# Patient Record
Sex: FEMALE | Race: WHITE | NOT HISPANIC OR LATINO | Employment: OTHER | URBAN - METROPOLITAN AREA
[De-identification: names, ages, dates, MRNs, and addresses within clinical notes are randomized per-mention and may not be internally consistent; named-entity substitution may affect disease eponyms.]

---

## 2018-01-02 PROBLEM — R79.89 ELEVATED LACTIC ACID LEVEL: Status: ACTIVE | Noted: 2018-01-02

## 2018-01-02 PROBLEM — K52.9 COLITIS: Status: ACTIVE | Noted: 2018-01-02

## 2018-01-02 PROBLEM — C50.919 BREAST CANCER: Status: ACTIVE | Noted: 2018-01-02

## 2018-01-03 PROBLEM — R19.7 DIARRHEA, UNSPECIFIED: Status: ACTIVE | Noted: 2018-01-03

## 2018-01-24 PROBLEM — R79.89 ELEVATED LACTIC ACID LEVEL: Status: RESOLVED | Noted: 2018-01-02 | Resolved: 2018-01-24

## 2018-01-24 PROBLEM — C50.919 BREAST CANCER: Status: RESOLVED | Noted: 2018-01-02 | Resolved: 2018-01-24

## 2018-01-24 PROBLEM — R19.7 DIARRHEA, UNSPECIFIED: Status: RESOLVED | Noted: 2018-01-03 | Resolved: 2018-01-24

## 2018-01-24 PROBLEM — K52.9 ACUTE COLITIS: Status: RESOLVED | Noted: 2018-01-02 | Resolved: 2018-01-24

## 2018-06-13 ENCOUNTER — DOCUMENTATION ONLY (OUTPATIENT)
Dept: RADIATION ONCOLOGY | Facility: CLINIC | Age: 73
End: 2018-06-13

## 2018-06-13 ENCOUNTER — OFFICE VISIT (OUTPATIENT)
Dept: RADIATION ONCOLOGY | Facility: CLINIC | Age: 73
End: 2018-06-13
Payer: MEDICARE

## 2018-06-13 VITALS
HEIGHT: 63 IN | BODY MASS INDEX: 29.06 KG/M2 | RESPIRATION RATE: 18 BRPM | TEMPERATURE: 98 F | HEART RATE: 76 BPM | DIASTOLIC BLOOD PRESSURE: 67 MMHG | SYSTOLIC BLOOD PRESSURE: 129 MMHG | WEIGHT: 164 LBS

## 2018-06-13 DIAGNOSIS — C50.919: ICD-10-CM

## 2018-06-13 DIAGNOSIS — C50.912 INVASIVE DUCTAL CARCINOMA OF BREAST, FEMALE, LEFT: Primary | ICD-10-CM

## 2018-06-13 PROCEDURE — 99205 OFFICE O/P NEW HI 60 MIN: CPT | Mod: ,,, | Performed by: RADIOLOGY

## 2018-06-13 NOTE — PROGRESS NOTES
Mandy Hidalgo  [id number]  1945 6/13/2018  Mauricio Martinez Iii, [place]  [address]  [place] [address]    REASON FOR CONSULTATION: Clinical IIA, [id number] converted to IA, [id number](sn)(i-)M0 triple +, g2 IDC L breast  TREATMENT GOAL: adjuvant    HISTORY OF PRESENT ILLNESS:   73F with screening mammogram detected abnormality of the left breast confirmed by ultrasound to be 1.3 cm in the 2:00 position. MRI of the breast confirmed this to be a 2.1 cm area of enhancement. Biopsy left breast mass returned triple positive, grade 2 invasive ductal carcinoma while biopsy of a left axillary lymph node was ruled reactive. She was placed on neoadjuvant systemic therapy which was poorly tolerated. Originally planned for TCHP converted to THP and stopped before end of program.    *3/18 MRI Breasts  Post neoadjuvant evaluation reveals no residual enhancement and marked decrease in size of the circumscribed lesion with biopsy clip still in place and previously biopsied reactive lymph node decreased in size  *4/18 Dr. Michelle JARAMILLO lumpectomy and SLNBx   - 7 mm invasive ductal carcinoma, grade 2, SBR 6/9, LVSI and PNI negative   - no DCIS   - margin (0.5mm post)   - 0/3 SLNs (i-)    *Dr. Sultana: complete 1yr q3wk Herceptin +/- Perjeta based on tolerance; + AI  *Dr. Martinez: proceed to adjuvant RT    At today's visit patient denies fever, chills, chest pain, shortness of breath. 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 She does have MS diagnosed in 2003 which is 1 flare since that time. She has plans to continue with Herceptin and Perjeta during radiotherapy. She denies decreased range of motion of the left upper extremity, swelling of the left upper extremity, pain or discomfort within the breast.    Review of Systems   Constitutional: Negative for appetite change, chills, fever and unexpected weight change.   HENT:   Negative for lump/mass, mouth sores, sore throat, trouble  "swallowing and voice change.    Eyes: Negative for eye problems and icterus.   Respiratory: Negative for cough, hemoptysis and shortness of breath.    Cardiovascular: Negative for chest pain and leg swelling.   Gastrointestinal: Negative for abdominal pain, constipation, diarrhea, nausea and vomiting.   Genitourinary: Negative for dysuria, frequency, hematuria, nocturia and vaginal bleeding.    Musculoskeletal: Positive for gait problem. Negative for back pain, neck pain and neck stiffness.   Neurological: Positive for extremity weakness and gait problem. Negative for headaches, numbness and seizures.   Hematological: Negative for adenopathy.     Past Medical History:   Diagnosis Date    a Family H/O CAD     Dr. Rui Aparicio In [place]; "SEs q5Yr;" 1/24/18 RXd ASA 81 Mg Daily; Her Father    b Hypertension     Dr. Rui Aparicio In [place]; On Losartan 100 Mg Daily, And Toprol-XL 25 Mg Daily    c Low HDL Levels     4/5/18 RXd OTC Flaxseed Oil 2K Mg Daily; Lipitor Historically Caused Myalgias And Bigeminy And Neurology Stated No More Statins    c Mild Hypertriglyceridemia     d Prediabetes     j H/O Laparoscopic Cholecystectomy In 12/2017     Dr. Allen Urrutia In [place]    j Mildly Elevated Lactic Acid Levels     [facility] 1/2/18-1/5/18 Stay Included This Diagnosis    j Noninfectious Colitis     [facility] 1/2/18-1/5/18 Stay For This; Was Felt Likely Due To CMT And Postcholecystectomy State; Was D/Cd On Alternating PRN Imodium And Lomotil, And Cholestyramine (Questran Light) 4 Gm TID With Meals But This Was Innefective    j Postcholecystectomy Diarrhea     [facility] 1/2/18-1/5/18 Stay For This; Was Felt Likely Due To CMT And Postcholecystectomy State; Was D/Cd On Alternating PRN Imodium And Lomotil, And Cholestyramine (Questran Light) 4 Gm TID With Meals But This Was Innefective    k Stage IIa Left Breast Cancer DXd In 11/2017     Dr. Luis Sultana At [facility] In [place]; Currently (As Of 01/2018) Is On " CMT; This Was Sentinal Node Negative; After 5 CMT TXs She Will Have A Left Lumpectomy Followed By XRT TXs And Herceptin    m H/O Brain AVM Repair Via Left Occipital Craniectomy     With H/O Intracranial Hemorrhage; In 08/2014; She Has Right Sided Numbness, And A Mild LUE Tremor, And Diplopia, And Ataxia     m Migraines     m Multiple Sclerosis     Dr. Anant Sauer; On Copaxone 40 Mg SQ TIW For This    p Diplopia     q Vitamin D Deficiency     4/5/18 RXd OTC D3 5K IU Daily     Past Surgical History:   Procedure Laterality Date    BRAIN AVM REPAIR      BREAST LUMPECTOMY Left     CHOLECYSTECTOMY      HYSTERECTOMY       Social History     Social History    Marital status:      Spouse name: N/A    Number of children: N/A    Years of education: N/A     Social History Main Topics    Smoking status: Never Smoker    Smokeless tobacco: Never Used    Alcohol use Yes      Comment: 1 martini every night    Drug use: No    Sexual activity: Not Asked     Other Topics Concern    None     Social History Narrative    None     Family History   Problem Relation Age of Onset    Heart disease Father        PRIOR HISTORY OF CHEMOTHERAPY OR RADIOTHERAPY: Please see HPI for patients prior oncologic history.    Medication List with Changes/Refills   Current Medications    CHOLECALCIFEROL, VITAMIN D3, 5,000 UNIT TAB    Take 5,000 Units by mouth once daily.    CYANOCOBALAMIN (VITAMIN B-12) 1000 MCG TABLET    Take 100 mcg by mouth once daily.    FLAXSEED OIL 1,000 MG CAP    Take 2,000 mg by mouth once daily.    GLATIRAMER (COPAXONE) 40 MG/ML SYRG INJECTION    Inject 40 mg into the skin 3 (three) times a week.    METFORMIN (GLUCOPHAGE) 500 MG TABLET    Take 500 mg by mouth 2 (two) times daily with meals.     METOPROLOL SUCCINATE (TOPROL-XL) 25 MG 24 HR TABLET    Take 1 tablet (25 mg total) by mouth once daily.   Discontinued Medications    L.ACIDOPHIL,PARAC-S.THERM-BIF. (RISAQUAD) CAP CAPSULE    Take 1 capsule by  "mouth once daily.    LOSARTAN (COZAAR) 100 MG TABLET    Take 1 tablet (100 mg total) by mouth once daily.    METFORMIN (GLUCOPHAGE) 1000 MG TABLET    Take 1 tablet (1,000 mg total) by mouth 2 (two) times daily with meals.    METOPROLOL TARTRATE (LOPRESSOR) 25 MG TABLET    Take 1 tablet (25 mg total) by mouth once daily.    ONDANSETRON (ZOFRAN-ODT) 8 MG TBDL    4 mg every 6 (six) hours as needed.      Review of patient's allergies indicates:   Allergen Reactions    Codeine Nausea And Vomiting    Lipitor [atorvastatin]      Myalgias And Bigeminy    Piperacillin     Vancomycin analogues        QUALITY OF LIFE: 90%- Able to Carry on Normal Activity: Minor Symptoms of Disease    Vitals:    06/13/18 1337   BP: 129/67   Pulse: 76   Resp: 18   Temp: 98.4 °F (36.9 °C)   TempSrc: Oral   Weight: 74.4 kg (164 lb)   Height: 5' 3" (1.6 m)   PainSc: 0-No pain       PHYSICAL EXAM:   GENERAL: alert; in no apparent distress.   HEAD: normocephalic, atraumatic. Alopecia with wig  EYES: pupils are equal, round, reactive to light and accommodation. Sclera anicteric. Conjunctiva not injected.   NOSE/THROAT: no nasal erythema or rhinorrhea. Oropharynx pink, without erythema, ulcerations or thrush.   NECK: no cervical motion rigidity; supple with no masses.  CHEST: Patient is speaking comfortably on room air with normal work of breathing without using accessory muscles of respiration.  MUSCULOSKELETAL: no tenderness to palpation along the spine or scapulae. Normal range of motion.  NEUROLOGIC: cranial nerves II-XII intact bilaterally. Appreciable right-sided hemiparesis Unsteady gait.  LYMPHATIC: no  axillary adenopathy appreciated bilaterally.   EXTREMITIES: no clubbing, cyanosis, edema.  SKIN: no erythema, rashes, ulcerations noted.   BREAST: Left breast with visible volume reduction however with both incisions clean dry and intact without palpable seroma or nodularity. Approximate B cup.    REVIEW OF IMAGING/PATHOLOGY/LABS: Please " see HPI. All images reviewed personally by dictating physician.     ASSESSMENT: 73F with stage clinical IIA, [id number] converted to IA, [id number](sn)(i-)M0 triple +, g2 IDC L breast s/p incomplete TCHP, margin (-) lumpectomy.  PLAN:   Mrs. Hidalgo was diagnosed with a clinical T2 biopsy-proven N0 invasive ductal carcinoma that was triple positive and was treated incompletely with neoadjuvant systemic therapy secondary to patient intolerance. At lumpectomy she had a 7 mm invasive ductal carcinoma with no evidence of ductal carcinoma in situ. Margins were negative with the closest margin posteriorly at 0.5 mm but per the surgeon included an assessment of the pectoralis. She presents today discussed the role of adjuvant radiotherapy. Notably she is planned for one year of adjuvant dual HER-2 blockade and an aromatase inhibitor per Dr. Yeboah.    I carefully explained that adjuvant radiotherapy is used to complete her breast conserving protocol and has been shown to provide a survival benefit while achieving equivalent oncologic results to mastectomy. I do diagrams demonstrating treatment using opposed tangents and carefully diagrammed the daily treatment position. On examination today, given the chest wall separation and cup size I recommended a hypo-fractionated course of 4050 cGy the left breast, 5050 cGy to lumpectomy cavity. This will be done with 3-D conformal techniques and daily image guidance.    We discussed the process of CT simulation and daily treatment with weekly on treatment visits with the physician and the patient would like to proceed with therapy. Therapy will be completed concurrently with HER-2 blockade.    We discussed the risks and benefits of the above treatment and have gone over in detail the acute and late toxicities of radiation therapy to the left breast. The patient expressed  understanding and has signed a consent form which is included in the patients chart. The patient has our contact  information and understands that they are free to contact us at any time with questions or concerns regarding radiation therapy.    DISPOSITION: RTC FOR CT SIM    I have personally seen and evaluated this patient. Greater than 50% of this time was spent discussing coordination of care and/or counseling.    PHYSICIAN: Rui Jones Jr, MD

## 2018-06-13 NOTE — PROGRESS NOTES
Mandy Hidalgo  [id number]  1945  6/13/2018  No referring provider defined for this encounter.    DIAGNOSIS:Clinical IIA, [id number] converted to IA, [id number](sn)(i-)M0 triple +, g2 IDC L breast     TREATMENT SITE(S): left breast    INTENT: CURATIVE    TREATMENT SETTING: ADJUVANT     MODALITY: PHOTON    TECHNIQUE:  3D CONFORMAL RADIOTHERAPY (3DCRT) with DIODES    IMRT MEDICAL NECESSITY: N/A    I have personally performed treatment planning for the patient, reviewing relevant history/physical and imaging. I have defined GTV, CTV, PTV and organs at risk.     In order to accomplish this plan, I am ordering:  SIMULATION: CT SIMULATION FOR PLACEMENT OF TREATMENT FIELDS    CONTRAST: none    TO ACCOMPLISH REPRODUCIBLE POSITION: VACLOC, BREAST BOARD and WING BOARD    DEVICES FOR BEAM SHAPING: CUSTOMIZED MLC    CUSTOMIZED BOLUS: none    IMAGING: DAILY KV/KV OBI, WEEKLY PORTALS and CBCT DAILY @ [facility]    I have ordered a weekly physics check.    SPECIAL PHYSICS CONSULT: NO  REASON: N/A    SPECIAL TREATMENT CIRCUMSTANCE: YES  Concurrent or recent administration of chemotherapeutic agents which are known potent radiosensitizers and thus will require vigilant monitoring for exaggerated radiation toxicities.    LABS: NONE    ANTICIPATED PRESCRIPTION TO ISOCENTER: 4050cGy/15frx to L breast + 1000cGy/5frx to lumpectomy (total 5050cGy/20frx)    ENERGY: 6/23X    TREATMENT: DAILY    PHYSICIAN: Rui Jones Jr, MD

## 2018-06-13 NOTE — PATIENT INSTRUCTIONS
Instructions given on breast radiation and skin care.  [facility] program booklet given. Patient verbalized understanding.

## 2018-08-06 ENCOUNTER — OFFICE VISIT (OUTPATIENT)
Dept: RADIATION ONCOLOGY | Facility: CLINIC | Age: 73
End: 2018-08-06
Payer: MEDICARE

## 2018-08-06 VITALS — WEIGHT: 164 LBS | BODY MASS INDEX: 29.05 KG/M2

## 2018-08-06 DIAGNOSIS — C50.919: Primary | ICD-10-CM

## 2018-08-06 PROCEDURE — 99024 POSTOP FOLLOW-UP VISIT: CPT | Mod: ,,, | Performed by: RADIOLOGY

## 2018-08-06 NOTE — PROGRESS NOTES
Mandy Hidalgo  [id number]  1945 8/6/2018  Mauricio Martinez Iii, Md  [address]  [place] [address]    DIAGNOSIS: Clinical IIA, [id number] converted to IA, [id number](sn)(i-)M0 triple +, g2 IDC L breast    TREATMENT GOAL: adjuvant    HISTORY OF PRESENT ILLNESS:   73F with screening mammogram detected abnormality of the left breast confirmed by ultrasound to be 1.3 cm in the 2:00 position. MRI of the breast confirmed this to be a 2.1 cm area of enhancement. Biopsy left breast mass returned triple positive, grade 2 invasive ductal carcinoma while biopsy of a left axillary lymph node was ruled reactive. She was placed on neoadjuvant systemic therapy which was poorly tolerated. Originally planned for TCHP converted to THP and stopped before end of program.    *3/18 MRI Breasts  Post neoadjuvant evaluation reveals no residual enhancement and marked decrease in size of the circumscribed lesion with biopsy clip still in place and previously biopsied reactive lymph node decreased in size  *4/18 Dr. Michelle JARAMILLO lumpectomy and SLNBx   - 7 mm invasive ductal carcinoma, grade 2, SBR 6/9, LVSI and PNI negative   - no DCIS   - margin (0.5mm post)   - 0/3 SLNs (i-)    *Dr. Sultana: complete 1yr q3wk Herceptin +/- Perjeta based on tolerance; + AI  *Dr. Martinez: proceed to adjuvant RT    Patient completed adjuvant radiotherapy, 4050 cGy to the left breast, 5050 cGy to lumpectomy cavity in July 2018.    INTERVAL HISTORY:   Since completion of therapy patient reports her energy has returned to approximate 65%. She has not yet started antiestrogen therapy. She continues on dual HER-2 blockade. She does not yet have plans for further imaging. She is using moisturizers intermittently and aloe daily.    Review of Systems   Constitutional: Positive for fatigue. Negative for appetite change, chills, fever and unexpected weight change.   HENT:   Negative for lump/mass, mouth sores, sore throat, trouble swallowing and voice  "change.    Eyes: Negative for eye problems and icterus.   Respiratory: Negative for cough, hemoptysis and shortness of breath.    Cardiovascular: Negative for chest pain and leg swelling.   Gastrointestinal: Negative for abdominal pain, constipation, diarrhea, nausea and vomiting.   Genitourinary: Negative for dysuria, frequency, hematuria, nocturia and vaginal bleeding.    Musculoskeletal: Negative for back pain, gait problem, neck pain and neck stiffness.   Neurological: Negative for extremity weakness, gait problem, headaches, numbness and seizures.   Hematological: Negative for adenopathy.     Past Medical History:   Diagnosis Date    a Family H/O CAD     Dr. Rui Aparicio In [place]; "SEs q5Yr;" 1/24/18 RXd ASA 81 Mg Daily; Her Father    b Hypertension     Dr. Rui Aparicio In [place]; On Losartan 100 Mg Daily, And Toprol-XL 25 Mg Daily    c Low HDL Levels     4/5/18 RXd OTC Flaxseed Oil 2K Mg Daily; Lipitor Historically Caused Myalgias And Bigeminy And Neurology Stated No More Statins    c Mild Hypertriglyceridemia     d Prediabetes     j H/O Laparoscopic Cholecystectomy In 12/2017     Dr. Allen Urrutia In [place]    j Mildly Elevated Lactic Acid Levels     [facility] 1/2/18-1/5/18 Stay Included This Diagnosis    j Noninfectious Colitis     [facility] 1/2/18-1/5/18 Stay For This; Was Felt Likely Due To CMT And Postcholecystectomy State; Was D/Cd On Alternating PRN Imodium And Lomotil, And Cholestyramine (Questran Light) 4 Gm TID With Meals But This Was Innefective    j Postcholecystectomy Diarrhea     ST 1/2/18-1/5/18 Stay For This; Was Felt Likely Due To CMT And Postcholecystectomy State; Was D/Cd On Alternating PRN Imodium And Lomotil, And Cholestyramine (Questran Light) 4 Gm TID With Meals But This Was Innefective    k Stage IIa Left Breast Cancer S/P Lumpectomy In 04/2018     Dr. Luis Sultana At [facility] In [place]; Currently (As Of 01/2018) Is On CMT; This Was Sentinal Node Negative; " After 5 CMT TXs She Will Have A Left Lumpectomy Followed By XRT TXs And Herceptin And Progetta    m H/O Brain AVM Repair Via Left Occipital Craniectomy     With H/O Intracranial Hemorrhage; In 08/2014; She Has Right Sided Numbness, And A Mild LUE Tremor, And Diplopia, And Ataxia     m Migraines     m Multiple Sclerosis     Dr. Anant Sauer; On Copaxone 40 Mg SQ TIW For This    p Diplopia     q Vitamin D Deficiency     4/5/18 RXd OTC D3 5K IU Daily     Past Surgical History:   Procedure Laterality Date    BRAIN AVM REPAIR      BREAST LUMPECTOMY Left     CHOLECYSTECTOMY      HYSTERECTOMY       Social History     Social History    Marital status:      Spouse name: N/A    Number of children: N/A    Years of education: N/A     Social History Main Topics    Smoking status: Never Smoker    Smokeless tobacco: Never Used    Alcohol use Yes      Comment: 1 martini every night    Drug use: No    Sexual activity: Not Asked     Other Topics Concern    None     Social History Narrative    None     Family History   Problem Relation Age of Onset    Heart disease Father      Medication List with Changes/Refills   Current Medications    CHOLECALCIFEROL, VITAMIN D3, 5,000 UNIT TAB    Take 5,000 Units by mouth once daily.    CYANOCOBALAMIN (VITAMIN B-12) 1000 MCG TABLET    Take 100 mcg by mouth once daily.    FLAXSEED OIL 1,000 MG CAP    Take 2,000 mg by mouth once daily.    GLATIRAMER (COPAXONE) 40 MG/ML SYRG INJECTION    Inject 40 mg into the skin 3 (three) times a week.    METFORMIN (GLUCOPHAGE) 500 MG TABLET    Take 500 mg by mouth 2 (two) times daily with meals.     METOPROLOL SUCCINATE (TOPROL-XL) 25 MG 24 HR TABLET    Take 1 tablet (25 mg total) by mouth once daily.     Review of patient's allergies indicates:   Allergen Reactions    Codeine Nausea And Vomiting    Lipitor [atorvastatin]      Myalgias And Bigeminy    Piperacillin     Vancomycin analogues        QUALITY OF LIFE: 90%- Able to Carry  on Normal Activity: Minor Symptoms of Disease    Vitals:    08/06/18 1031   Weight: 74.4 kg (164 lb)   PainSc: 0-No pain       PHYSICAL EXAM:   GENERAL: alert; in no apparent distress.   HEAD: normocephalic, atraumatic. + wig  EYES: pupils are equal, round, reactive to light and accommodation. Sclera anicteric. Conjunctiva not injected.   NOSE/THROAT: no nasal erythema or rhinorrhea. Oropharynx pink, without erythema, ulcerations or thrush.   NECK: no cervical motion rigidity; supple with no masses.  CHEST:  Patient is speaking comfortably on room air with normal work of breathing without using accessory muscles of respiration.  ABDOMEN: soft, nontender, nondistended. Bowel sounds present.   MUSCULOSKELETAL: no tenderness to palpation along the spine or scapulae. Normal range of motion.  NEUROLOGIC: cranial nerves II-XII intact bilaterally.   LYMPHATIC: no left axillary adenopathy appreciated .   EXTREMITIES: no clubbing, cyanosis, edema.  SKIN: no erythema, rashes, ulcerations noted.   BREAST: Minimal hyperpigmentation with mild scaling about the nipple, no palpable seroma or nodularity    ANCILLARY DATA: None    ASSESSMENT: 73F with clinical IIA, gN3W4C3 converted to IA, [id number](sn)(i-)M0 triple +, g2 IDC L breast s/p TCHP/THP, lumpectomy, adjuvant RT to 5050cGy ending 7/18; on dual her2 blockade, plan for AI x 5yrs.  PLAN:  Mrs. Hidalgo did very well with her radiotherapy and was treated with a hypo-fractionated course. She had minimal appreciable skin toxicity and at today's visit has just mild hyperpigmentation and scaling. I recommended using an exfoliator and continuing with aloe vera and/or moisturizers as needed, as well as sunblock. I also recommended range of motion exercises and axillary massage. She will discuss initiation of antiestrogen therapy with her medical oncologist as well as any further imaging. I would recommend she have at least a mammogram in 6 months. I will follow her every 6 months for  the first 2 years and asked her to return to clinic after her mammogram.    All questions answered and contact information provided. Patient understands free to call us anytime with any questions or concerns regarding radiation therapy.    I have personally seen and evaluated this patient. Greater than 50% of this time was spent discussing coordination of care and/or counseling.    PHYSICIAN: Rui Jones Jr, MD

## 2019-02-06 ENCOUNTER — OFFICE VISIT (OUTPATIENT)
Dept: RADIATION ONCOLOGY | Facility: CLINIC | Age: 74
End: 2019-02-06
Payer: MEDICARE

## 2019-02-06 VITALS
DIASTOLIC BLOOD PRESSURE: 69 MMHG | HEART RATE: 73 BPM | WEIGHT: 164 LBS | OXYGEN SATURATION: 98 % | TEMPERATURE: 98 F | RESPIRATION RATE: 14 BRPM | SYSTOLIC BLOOD PRESSURE: 147 MMHG | BODY MASS INDEX: 29.05 KG/M2

## 2019-02-06 DIAGNOSIS — C50.919: Primary | ICD-10-CM

## 2019-02-06 PROCEDURE — 99214 OFFICE O/P EST MOD 30 MIN: CPT | Mod: ,,, | Performed by: RADIOLOGY

## 2019-02-06 PROCEDURE — 99214 PR OFFICE/OUTPT VISIT, EST, LEVL IV, 30-39 MIN: ICD-10-PCS | Mod: ,,, | Performed by: RADIOLOGY

## 2019-02-06 NOTE — PROGRESS NOTES
Mandy Hidalgo  [id number]  1945 2/6/2019  Mauricio Martinez Iii, Md  [address]  [place] [address]    DIAGNOSIS: Clinical IIA, [id number] converted to IA, [id number](sn)(i-)M0 triple +, g2 IDC L breast    TREATMENT GOAL: adjuvant    HISTORY OF PRESENT ILLNESS:   73F with screening mammogram detected abnormality of the left breast confirmed by ultrasound to be 1.3 cm in the 2:00 position. MRI of the breast confirmed this to be a 2.1 cm area of enhancement. Biopsy left breast mass returned triple positive, grade 2 invasive ductal carcinoma while biopsy of a left axillary lymph node was ruled reactive. She was placed on neoadjuvant systemic therapy which was poorly tolerated. Originally planned for TCHP converted to THP and stopped before end of program.    *3/18 MRI Breasts  Post neoadjuvant evaluation reveals no residual enhancement and marked decrease in size of the circumscribed lesion with biopsy clip still in place and previously biopsied reactive lymph node decreased in size  *4/18 Dr. Martinez L lumpectomy and SLNBx   - 7 mm invasive ductal carcinoma, grade 2, SBR 6/9, LVSI and PNI negative   - no DCIS   - margin (0.5mm post)   - 0/3 SLNs (i-)    *Dr. Sultana: complete 1yr q3wk Herceptin +/- Perjeta based on tolerance; + AI  *Dr. Martinez: proceed to adjuvant RT    Patient completed adjuvant radiotherapy, 4050 cGy to the left breast, 5050 cGy to lumpectomy cavity in July 2018.    INTERVAL HISTORY:   Patient continues on Herceptin and Perjeta and reports mild diarrhea controlled with Imodium, associated with infusion. She also has begun anastrozole with rare hot flashes but denies arthralgias. She denies pain or discomfort within the left breast and reports good range of motion without swelling left upper extremity. She does have a cold presently but denies fever, chills, chest pain, shortness of breath, cough, hemoptysis, bone pain.    Review of Systems   Constitutional: Negative for appetite  "change, chills, fever and unexpected weight change.   HENT:   Negative for lump/mass, mouth sores, sore throat, trouble swallowing and voice change.    Eyes: Negative for eye problems and icterus.   Respiratory: Negative for cough, hemoptysis and shortness of breath.    Cardiovascular: Negative for chest pain and leg swelling.   Gastrointestinal: Positive for diarrhea. Negative for abdominal pain, constipation, nausea and vomiting.   Genitourinary: Negative for dysuria, frequency, hematuria, nocturia and vaginal bleeding.    Musculoskeletal: Negative for back pain, gait problem, neck pain and neck stiffness.   Neurological: Negative for extremity weakness, gait problem, headaches, numbness and seizures.   Hematological: Negative for adenopathy.     Past Medical History:   Diagnosis Date    a Family H/O CAD     Dr. Rui Aparicio In [place]; "SEs q5Yr;" 1/24/18 RXd ASA 81 Mg Daily; Her Father    b Hypertension     Dr. Rui Aparicio In [place]; On Losartan 100 Mg Daily, And Toprol-XL 25 Mg Daily    c Low HDL Levels     4/5/18 RXd OTC Flaxseed Oil 2K Mg Daily; Lipitor Historically Caused Myalgias And Bigeminy And Neurology Stated No More Statins    c Mild Hypertriglyceridemia     d Prediabetes     On Metformin 500 Mg BID    g Chronic Mild Thrombocytopenia     Am Monitoring    j H/O Laparoscopic Cholecystectomy In 12/2017     Dr. Allen Urrutia In [place]    j Mildly Elevated Lactic Acid Levels     [facility] 1/2/18-1/5/18 Stay Included This Diagnosis    j Noninfectious Colitis     [facility] 1/2/18-1/5/18 Stay For This; Was Felt Likely Due To CMT And Postcholecystectomy State; Was D/Cd On Alternating PRN Imodium And Lomotil, And Cholestyramine (Questran Light) 4 Gm TID With Meals But This Was Innefective    j Postcholecystectomy Diarrhea     [facility] 1/2/18-1/5/18 Stay For This; Was Felt Likely Due To CMT And Postcholecystectomy State; Was D/Cd On Alternating PRN Imodium And Lomotil, And Cholestyramine (Questran " Light) 4 Gm TID With Meals But This Was Innefective    k Stage IIa Left Breast Cancer S/P Lumpectomy In 04/2018     Dr. Luis Sultana At [facility] In [place]; On Arimidex Daily; Underwent Lumpectomy In 04/2018 After 5 CMT TXs Then XRT And (As Of 1/30/19) IV Herceptin And Progetta; This Was Sentinal Node Negative    m H/O Brain AVM Repair Via Left Occipital Craniectomy     With H/O Intracranial Hemorrhage; In 08/2014; She Has Right Sided Numbness, And A Mild LUE Tremor, And Diplopia, And Ataxia     m Migraines     m Multiple Sclerosis     Dr. Anant Sauer; On Copaxone 40 Mg SQ TIW For This    p Diplopia     q Vitamin D Deficiency     4/5/18 RXd OTC D3 5K IU Daily     Past Surgical History:   Procedure Laterality Date    BRAIN AVM REPAIR      BREAST LUMPECTOMY Left     CHOLECYSTECTOMY      HYSTERECTOMY       Social History     Socioeconomic History    Marital status:      Spouse name: None    Number of children: None    Years of education: None    Highest education level: None   Social Needs    Financial resource strain: None    Food insecurity - worry: None    Food insecurity - inability: None    Transportation needs - medical: None    Transportation needs - non-medical: None   Occupational History    None   Tobacco Use    Smoking status: Never Smoker    Smokeless tobacco: Never Used   Substance and Sexual Activity    Alcohol use: Yes     Comment: 1 martini every night    Drug use: No    Sexual activity: None   Other Topics Concern    None   Social History Narrative    None     Family History   Problem Relation Age of Onset    Heart disease Father      Medication List with Changes/Refills   Current Medications    ANASTROZOLE (ARIMIDEX) 1 MG TAB    1 mg once daily .    CHOLECALCIFEROL, VITAMIN D3, 5,000 UNIT TAB    Take 5,000 Units by mouth once daily.    CYANOCOBALAMIN (VITAMIN B-12) 1000 MCG TABLET    Take 100 mcg by mouth once daily.    FLAXSEED OIL 1,000 MG CAP    Take 2,000  mg by mouth once daily.    GLATIRAMER (COPAXONE) 40 MG/ML SYRG INJECTION    Inject 40 mg into the skin 3 (three) times a week.    LOSARTAN (COZAAR) 25 MG TABLET    Take 1 tablet (25 mg total) by mouth once daily.    METFORMIN (GLUCOPHAGE-XR) 500 MG 24 HR TABLET    Take 1 tablet (500 mg total) by mouth daily with breakfast.    METOPROLOL SUCCINATE (TOPROL-XL) 25 MG 24 HR TABLET    TAKE 1 TABLET(25 MG) BY MOUTH EVERY DAY     Review of patient's allergies indicates:   Allergen Reactions    Codeine Nausea And Vomiting    Lipitor [atorvastatin]      Myalgias And Bigeminy    Piperacillin     Vancomycin analogues        QUALITY OF LIFE: 90%- Able to Carry on Normal Activity: Minor Symptoms of Disease    Vitals:    02/06/19 0917   BP: (!) 147/69   Pulse: 73   Resp: 14   Temp: 98.3 °F (36.8 °C)   SpO2: 98%   Weight: 74.4 kg (164 lb)   PainSc: 0-No pain       PHYSICAL EXAM:   GENERAL: alert; in no apparent distress.   HEAD: normocephalic, atraumatic. + wig  EYES: pupils are equal, round, reactive to light and accommodation. Sclera anicteric. Conjunctiva not injected.   NOSE/THROAT: no nasal erythema or rhinorrhea. Oropharynx pink, without erythema, ulcerations or thrush.   NECK: no cervical motion rigidity; supple with no masses.  CHEST:  Patient is speaking comfortably on room air with normal work of breathing without using accessory muscles of respiration.  ABDOMEN: soft, nontender, nondistended. Bowel sounds present.   MUSCULOSKELETAL: no tenderness to palpation along the spine or scapulae. Normal range of motion.  NEUROLOGIC: cranial nerves II-XII intact bilaterally.   LYMPHATIC: no left axillary adenopathy appreciated .   EXTREMITIES: no clubbing, cyanosis, edema.  SKIN: no erythema, rashes, ulcerations noted.   BREAST: No evidence of hyperpigmentation or fibrosis. No palpable seroma or nodularity at left breast.    ANCILLARY DATA:   12/18 [facility] BR-2    ASSESSMENT: 73F with clinical IIA, [id number] converted to IA,  [id number](sn)(i-)M0 triple +, g2 IDC L breast s/p TCHP/THP, lumpectomy, adjuvant RT to 5050cGy ending 7/18; on dual her2 blockade, AI x 5yrs.  PLAN:  Mrs. Hidalgo did very well with her radiotherapy and was treated with a hypo-fractionated course. She had minimal appreciable skin toxicity and at today's visit has no signs or symptoms of prior radiotherapy. She continues on dual HER-2 blockade and will do this through March. She is begun antiestrogen therapy and reports good tolerance. She has clear mammogram from December with plan for 6 month follow-up per her surgeon and Dr. Sultana. I'm very pleased with her tolerance to radiotherapy as well as his systemic therapy and her clinical response. By review of systems, physical exam, mammogram she is DANIEL. Return to clinic in 6 months.    All questions answered and contact information provided. Patient understands free to call us anytime with any questions or concerns regarding radiation therapy.    I have personally seen and evaluated this patient. Greater than 50% of this time was spent discussing coordination of care and/or counseling.    PHYSICIAN: Rui Jones Jr, MD

## 2019-08-22 ENCOUNTER — OFFICE VISIT (OUTPATIENT)
Dept: RADIATION ONCOLOGY | Facility: CLINIC | Age: 74
End: 2019-08-22
Payer: MEDICARE

## 2019-08-22 VITALS
SYSTOLIC BLOOD PRESSURE: 149 MMHG | OXYGEN SATURATION: 97 % | WEIGHT: 180.19 LBS | HEART RATE: 66 BPM | BODY MASS INDEX: 31.92 KG/M2 | TEMPERATURE: 98 F | DIASTOLIC BLOOD PRESSURE: 87 MMHG

## 2019-08-22 DIAGNOSIS — C50.919: Primary | ICD-10-CM

## 2019-08-22 PROCEDURE — 99213 OFFICE O/P EST LOW 20 MIN: CPT | Mod: S$GLB,,, | Performed by: RADIOLOGY

## 2019-08-22 PROCEDURE — 99213 PR OFFICE/OUTPT VISIT, EST, LEVL III, 20-29 MIN: ICD-10-PCS | Mod: S$GLB,,, | Performed by: RADIOLOGY

## 2019-08-22 NOTE — PROGRESS NOTES
Mandy Hidalgo  [id number]  1945 8/22/2019  No referring provider defined for this encounter.    DIAGNOSIS: Clinical IIA, [id number] converted to IA, [id number](sn)(i-)M0 triple +, g2 IDC L breast    TREATMENT GOAL: adjuvant    HISTORY OF PRESENT ILLNESS:   73F with screening mammogram detected abnormality of the left breast confirmed by ultrasound to be 1.3 cm in the 2:00 position. MRI of the breast confirmed this to be a 2.1 cm area of enhancement. Biopsy left breast mass returned triple positive, grade 2 invasive ductal carcinoma while biopsy of a left axillary lymph node was ruled reactive. She was placed on neoadjuvant systemic therapy which was poorly tolerated. Originally planned for TCHP converted to THP and stopped before end of program.    *3/18 MRI Breasts  Post neoadjuvant evaluation reveals no residual enhancement and marked decrease in size of the circumscribed lesion with biopsy clip still in place and previously biopsied reactive lymph node decreased in size  *4/18 Dr. Martinez L lumpectomy and SLNBx   - 7 mm invasive ductal carcinoma, grade 2, SBR 6/9, LVSI and PNI negative   - no DCIS   - margin (0.5mm post)   - 0/3 SLNs (i-)    *Dr. Sultana: complete 1yr q3wk Herceptin +/- Perjeta based on tolerance; + AI  *Dr. Martinez: proceed to adjuvant RT    Patient completed adjuvant radiotherapy, 4050 cGy to the left breast, 5050 cGy to lumpectomy cavity in July 2018.    INTERVAL HISTORY:   Patient completed Herceptin and Perjeta. She is on anastrozole and denies arthralgias. On vitD. Today, she denies pain or discomfort within the left breast and reports mild limited range of motion without swelling left upper extremity. She denies fever, chills, chest pain, shortness of breath, cough, hemoptysis, bone pain.    Review of Systems   Constitutional: Negative for appetite change, chills, fever and unexpected weight change.   HENT:   Negative for lump/mass, mouth sores, sore throat, trouble swallowing and voice  "change.    Eyes: Negative for eye problems and icterus.   Respiratory: Negative for cough, hemoptysis and shortness of breath.    Cardiovascular: Negative for chest pain and leg swelling.   Gastrointestinal: Negative for abdominal pain, constipation, nausea and vomiting.   Genitourinary: Negative for dysuria, frequency, hematuria, nocturia and vaginal bleeding.    Musculoskeletal: Negative for back pain, gait problem, neck pain and neck stiffness.   Neurological: Negative for extremity weakness, gait problem, headaches, numbness and seizures.   Hematological: Negative for adenopathy.     Past Medical History:   Diagnosis Date    a Family H/O CAD     Dr. Rui Aparicio In [place]; "SEs q5Yr;" 1/24/18 RXd ASA 81 Mg Daily; Her Father    b Hypertension     Dr. Rui Aparicio In [place]; On Losartan 100 Mg Daily, And Toprol-XL 25 Mg Daily    c Low HDL Levels     4/5/18 RXd OTC Flaxseed Oil 2K Mg Daily; Lipitor Historically Caused Myalgias And Bigeminy And Neurology Stated No More Statins    c Mild Hypertriglyceridemia     d Prediabetes     On Metformin 500 Mg BID    g Chronic Mild Thrombocytopenia     Am Monitoring    j H/O Laparoscopic Cholecystectomy In 12/2017     Dr. Allen Urrutia In [place]    j Mildly Elevated Lactic Acid Levels     [facility] 1/2/18-1/5/18 Stay Included This Diagnosis    j Noninfectious Colitis     [facility] 1/2/18-1/5/18 Stay For This; Was Felt Likely Due To CMT And Postcholecystectomy State; Was D/Cd On Alternating PRN Imodium And Lomotil, And Cholestyramine (Questran Light) 4 Gm TID With Meals But This Was Innefective    j Postcholecystectomy Diarrhea     ST 1/2/18-1/5/18 Stay For This; Was Felt Likely Due To CMT And Postcholecystectomy State; Was D/Cd On Alternating PRN Imodium And Lomotil, And Cholestyramine (Questran Light) 4 Gm TID With Meals But This Was Innefective    k Stage IIa Left Breast Cancer S/P Lumpectomy In 04/2018     Dr. Luis Sultana At [facility] In [place]; " On Arimidex Daily; Underwent Lumpectomy In 04/2018 After 5 CMT TXs Then XRT And (As Of 1/30/19) IV Herceptin And Progetta; This Was Sentinal Node Negative    m H/O Brain AVM Repair Via Left Occipital Craniectomy     With H/O Intracranial Hemorrhage; In 08/2014; She Has Right Sided Numbness, And A Mild LUE Tremor, And Diplopia, And Ataxia     m Migraines     m Multiple Sclerosis     Dr. Anant Sauer; On Copaxone 40 Mg SQ TIW For This    p Diplopia     q Vitamin D Deficiency     4/5/18 RXd OTC D3 5K IU Daily    Wellnesss Visit 8/5/2019      Past Surgical History:   Procedure Laterality Date    BRAIN AVM REPAIR      BREAST LUMPECTOMY Left     CHOLECYSTECTOMY      HYSTERECTOMY       Social History     Socioeconomic History    Marital status:      Spouse name: Not on file    Number of children: Not on file    Years of education: Not on file    Highest education level: Not on file   Occupational History    Not on file   Social Needs    Financial resource strain: Not on file    Food insecurity:     Worry: Not on file     Inability: Not on file    Transportation needs:     Medical: Not on file     Non-medical: Not on file   Tobacco Use    Smoking status: Never Smoker    Smokeless tobacco: Never Used   Substance and Sexual Activity    Alcohol use: Yes     Comment: 1 martini every night    Drug use: No    Sexual activity: Not on file   Lifestyle    Physical activity:     Days per week: Not on file     Minutes per session: Not on file    Stress: Not on file   Relationships    Social connections:     Talks on phone: Not on file     Gets together: Not on file     Attends Rastafarian service: Not on file     Active member of club or organization: Not on file     Attends meetings of clubs or organizations: Not on file     Relationship status: Not on file   Other Topics Concern    Not on file   Social History Narrative    Not on file     Family History   Problem Relation Age of Onset    Heart  disease Father      Medication List with Changes/Refills   Current Medications    ANASTROZOLE (ARIMIDEX) 1 MG TAB    1 mg once daily .    BIOTIN 10,000 MCG CAP    Take by mouth.    CHOLECALCIFEROL, VITAMIN D3, 5,000 UNIT TAB    Take 5,000 Units by mouth once daily.    CYANOCOBALAMIN (VITAMIN B-12) 1000 MCG TABLET    Take 100 mcg by mouth once daily.    FLAXSEED OIL 1,000 MG CAP    Take 2,000 mg by mouth once daily.    GLATIRAMER (COPAXONE) 40 MG/ML SYRG INJECTION    Inject 40 mg into the skin 3 (three) times a week.    METFORMIN (GLUCOPHAGE-XR) 500 MG 24 HR TABLET    Take 1 tablet (500 mg total) by mouth daily with breakfast.    METOPROLOL SUCCINATE (TOPROL-XL) 50 MG 24 HR TABLET    Take 1 tablet (50 mg total) by mouth every morning.     Review of patient's allergies indicates:   Allergen Reactions    Codeine Nausea And Vomiting    Lipitor [atorvastatin]      Myalgias And Bigeminy    Piperacillin     Vancomycin analogues        QUALITY OF LIFE: 90%- Able to Carry on Normal Activity: Minor Symptoms of Disease    Vitals:    08/22/19 0944 08/22/19 0945   BP: (!) 149/87    Pulse: 66    Temp: 97.6 °F (36.4 °C)    TempSrc: Oral    SpO2:  97%   Weight: 81.7 kg (180 lb 3.2 oz)    PainSc: 0-No pain        PHYSICAL EXAM:   GENERAL: alert; in no apparent distress.   HEAD: normocephalic, atraumatic. + wig  EYES: pupils are equal, round, reactive to light and accommodation. Sclera anicteric. Conjunctiva not injected.   NOSE/THROAT: no nasal erythema or rhinorrhea. Oropharynx pink, without erythema, ulcerations or thrush.   NECK: no cervical motion rigidity; supple with no masses.  CHEST:  Patient is speaking comfortably on room air with normal work of breathing without using accessory muscles of respiration.  ABDOMEN: soft, nontender, nondistended. Bowel sounds present.   MUSCULOSKELETAL: no tenderness to palpation along the spine or scapulae. Normal range of motion.  NEUROLOGIC: cranial nerves II-XII intact bilaterally.    LYMPHATIC: no left axillary adenopathy appreciated .   EXTREMITIES: no clubbing, cyanosis, edema.  SKIN: no erythema, rashes, ulcerations noted.   BREAST: No evidence of hyperpigmentation or fibrosis. No palpable seroma or nodularity at left breast. Volume reduction    ANCILLARY DATA:   6/19 [facility] BR-2    ASSESSMENT: 73F with clinical IIA, [id number] converted to IA, dmS3xN4(sn)(i-)M0 triple +, g2 IDC L breast s/p TCHP/THP, lumpectomy, adjuvant RT to 5050cGy ending 7/18; on dual her2 blockade, AI x 5yrs.  PLAN:  Mrs. Hidalgo did very well with her radiotherapy and was treated with a hypo-fractionated course. She had minimal appreciable skin toxicity and at today's visit has no signs or symptoms of prior radiotherapy. She completed dual HER-2 blockade. She continues on antiestrogen therapy and reports good tolerance. She has clear mammogram from June with plan for 6 month follow-up per her surgeon and Dr. Sultana. I'm very pleased with her tolerance to radiotherapy as well as his systemic therapy and her clinical response. By review of systems, physical exam, mammogram she is DANIEL. Return to clinic in 1yr.    All questions answered and contact information provided. Patient understands free to call us anytime with any questions or concerns regarding radiation therapy.    I have personally seen and evaluated this patient. Greater than 50% of this time was spent discussing coordination of care and/or counseling.    PHYSICIAN: Rui Jones Jr, MD

## 2019-11-02 VITALS
OXYGEN SATURATION: 97 % | RESPIRATION RATE: 15 BRPM | SYSTOLIC BLOOD PRESSURE: 148 MMHG | HEART RATE: 60 BPM | OXYGEN SATURATION: 97 % | RESPIRATION RATE: 16 BRPM | HEART RATE: 60 BPM | TEMPERATURE: 98 F | DIASTOLIC BLOOD PRESSURE: 62 MMHG | TEMPERATURE: 98 F

## 2019-11-02 VITALS
OXYGEN SATURATION: 98 % | TEMPERATURE: 98 F | HEART RATE: 65 BPM | DIASTOLIC BLOOD PRESSURE: 56 MMHG | SYSTOLIC BLOOD PRESSURE: 139 MMHG | RESPIRATION RATE: 16 BRPM

## 2019-11-02 VITALS
HEART RATE: 66 BPM | OXYGEN SATURATION: 99 % | SYSTOLIC BLOOD PRESSURE: 144 MMHG | TEMPERATURE: 98 F | DIASTOLIC BLOOD PRESSURE: 65 MMHG

## 2019-11-02 PROBLEM — G45.9 TIA (TRANSIENT ISCHEMIC ATTACK): Status: ACTIVE | Noted: 2019-11-02

## 2019-11-02 PROBLEM — H57.052: Status: ACTIVE | Noted: 2019-11-02

## 2019-11-02 PROBLEM — E78.5 DYSLIPIDEMIA: Status: ACTIVE | Noted: 2019-11-02

## 2019-11-03 VITALS
DIASTOLIC BLOOD PRESSURE: 57 MMHG | SYSTOLIC BLOOD PRESSURE: 149 MMHG | HEART RATE: 59 BPM | TEMPERATURE: 98 F | RESPIRATION RATE: 16 BRPM | OXYGEN SATURATION: 97 %

## 2019-11-03 VITALS
SYSTOLIC BLOOD PRESSURE: 159 MMHG | DIASTOLIC BLOOD PRESSURE: 70 MMHG | OXYGEN SATURATION: 98 % | HEART RATE: 63 BPM | RESPIRATION RATE: 16 BRPM | TEMPERATURE: 97 F

## 2019-11-03 PROBLEM — I63.81 ACUTE LACUNAR INFARCTION: Status: ACTIVE | Noted: 2019-11-02

## 2019-11-03 PROBLEM — I63.9 LEFT BASAL GANGLIA EMBOLIC STROKE: Status: ACTIVE | Noted: 2019-11-02

## 2020-01-10 PROBLEM — R29.898 FINE MOTOR IMPAIRMENT: Status: ACTIVE | Noted: 2020-01-10

## 2020-01-10 PROBLEM — R29.818 FINE MOTOR IMPAIRMENT: Status: ACTIVE | Noted: 2020-01-10

## 2020-01-14 PROBLEM — R26.89 BALANCE DISORDER: Status: ACTIVE | Noted: 2020-01-14

## 2020-01-14 PROBLEM — R29.898 DECREASED STRENGTH OF LOWER EXTREMITY: Status: ACTIVE | Noted: 2020-01-14

## 2020-02-07 PROBLEM — E78.5 DYSLIPIDEMIA: Status: RESOLVED | Noted: 2019-11-02 | Resolved: 2020-02-07

## 2020-08-31 ENCOUNTER — CLINICAL SUPPORT (OUTPATIENT)
Dept: RADIATION ONCOLOGY | Facility: CLINIC | Age: 75
End: 2020-08-31
Payer: MEDICARE

## 2020-08-31 DIAGNOSIS — C50.919: Primary | ICD-10-CM

## 2020-08-31 PROCEDURE — 99441 PR PHYSICIAN TELEPHONE EVALUATION 5-10 MIN: CPT | Mod: 95,,, | Performed by: RADIOLOGY

## 2020-08-31 PROCEDURE — 99441 PR PHYSICIAN TELEPHONE EVALUATION 5-10 MIN: ICD-10-PCS | Mod: 95,,, | Performed by: RADIOLOGY

## 2020-08-31 NOTE — PROGRESS NOTES
Mandy Hidalgo  [id number]  1945 8/31/2020  No referring provider defined for this encounter.    DIAGNOSIS: clinical IIA, [id number] converted to IA, [id number](sn)(i-)M0 triple +, g2 IDC L breast   REASON FOR VISIT: Routine scheduled follow-up.    The patient location is:  home  Visit type: Virtual visit with audio ONLY  The reason for the audio only service rather than synchronous audio and video virtual visit was related to technical difficulties or patient preference/necessity.    HISTORY OF PRESENT ILLNESS:   75F with screening mammogram detected abnormality of the left breast confirmed by ultrasound to be 1.3 cm in the 2:00 position. MRI of the breast confirmed this to be a 2.1 cm area of enhancement. Biopsy left breast mass returned triple positive, grade 2 invasive ductal carcinoma while biopsy of a left axillary lymph node was ruled reactive. She was placed on neoadjuvant systemic therapy which was poorly tolerated. Originally planned for TCHP converted to THP and stopped before end of program.    *3/18 MRI Breasts  Post neoadjuvant evaluation reveals no residual enhancement and marked decrease in size of the circumscribed lesion with biopsy clip still in place and previously biopsied reactive lymph node decreased in size  *4/18 Dr. Michelle JARAMILLO lumpectomy and SLNBx   - 7 mm invasive ductal carcinoma, grade 2, SBR 6/9, LVSI and PNI negative   - no DCIS   - margin (0.5mm post)   - 0/3 SLNs (i-)    Patient completed adjuvant radiotherapy, 4050 cGy to the left breast, 5050 cGy to lumpectomy cavity in July 2018.    Dr. Sultana: completed 1yr q3wk Herceptin +/- Perjeta based on tolerance    On arimidex.    INTERVAL HISTORY:   Patient denies fever, chills, chest pain, shortness of breath, cough or hemoptysis.  She has rare soreness of the left breast reports good quality of life.  She has mild fatigue.  She denies arthralgias.    Review of systems otherwise negative unless indicated in HPI/interval history.    Past  Medical History:   Diagnosis Date    a Family H/O CAD ###    Dr. Santos Centeno At [facility] In [place]; 1/24/18 RXd ASA 81 Mg Daily; Her Father    b Hypertension     Dr. Santos Centeno At [facility] In [place]; On Losartan 100 Mg Daily, And Toprol-XL 25 Mg Daily    b White Coat Syndrome     c Hypercholesterolemia     On Repatha; She Does Not Tolerate Statins    c Low HDL Levels     4/5/18 RXd OTC Flaxseed Oil 2K Mg Daily; Lipitor Historically Caused Myalgias And Bigeminy And Neurology Stated No More Statins    c Mild Hypertriglyceridemia     d Prediabetes ###    8/1/20 RXd Javuvia 50 Mg Daily But She Wanted To Wait On This; Will Start Januvia In The Future If HgA1c Goes > 6.0; 2/7/20 D/Cd Metformin (Lactic Acidosis); On Metformin 500 Mg BID    g Chronic Mild Thrombocytopenia     Am Monitoring    j H/O Laparoscopic Cholecystectomy In 12/2017     Dr. Allen Urrutia In [place]    j Mildly Elevated Lactic Acid Levels     [facility] 1/2/18-1/5/18 Stay Included This Diagnosis; Is This Due To Metformin? 2/7/20 D/Cd Metformin    j Noninfectious Colitis     [facility] 1/2/18-1/5/18 Stay For This; Was Felt Likely Due To CMT And Postcholecystectomy State; Was D/Cd On Alternating PRN Imodium And Lomotil, And Cholestyramine (Questran Light) 4 Gm TID With Meals But This Was Innefective    j Postcholecystectomy Diarrhea     [facility] 1/2/18-1/5/18 Stay For This; Was Felt Likely Due To CMT And Postcholecystectomy State; Was D/Cd On Alternating PRN Imodium And Lomotil, And Cholestyramine (Questran Light) 4 Gm TID With Meals But This Was Innefective    k Stage IIa Left Breast Cancer S/P Lumpectomy In 04/2018 ######    Dr. Luis Sultana At [facility] In [place]; On Arimidex Daily; Underwent Lumpectomy In 04/2018 After 5 CMT TXs Then XRT And (As Of 1/30/19) IV Herceptin And Progetta; This Was Sentinal Node Negative    m H/O Brain AVM Repair Via Left Occipital Craniectomy ###    With H/O Intracranial Hemorrhage; In 08/2014; She Has Right  Sided Numbness, And A Mild LUE Tremor, And Diplopia, And Ataxia     m H/O Cerebrovascular Accident In 11/2019 ####    With No Sequelae    m Migraines     m Multiple Sclerosis ######    Dr. Anant Sauer; On Copaxone 40 Mg SQ TIW For This    p Diplopia ###    q Vitamin D Deficiency     4/5/18 RXd OTC D3 5K IU Daily    Wellnesss Visit 8/7/20      Past Surgical History:   Procedure Laterality Date    BRAIN AVM REPAIR      BREAST LUMPECTOMY Left     CHOLECYSTECTOMY      HYSTERECTOMY       Social History     Socioeconomic History    Marital status:      Spouse name: Not on file    Number of children: Not on file    Years of education: Not on file    Highest education level: Not on file   Occupational History    Not on file   Social Needs    Financial resource strain: Not on file    Food insecurity     Worry: Not on file     Inability: Not on file    Transportation needs     Medical: Not on file     Non-medical: Not on file   Tobacco Use    Smoking status: Never Smoker    Smokeless tobacco: Never Used   Substance and Sexual Activity    Alcohol use: Yes     Comment: 1 martini every night    Drug use: No    Sexual activity: Not on file   Lifestyle    Physical activity     Days per week: Not on file     Minutes per session: Not on file    Stress: Not on file   Relationships    Social connections     Talks on phone: Not on file     Gets together: Not on file     Attends Hoahaoism service: Not on file     Active member of club or organization: Not on file     Attends meetings of clubs or organizations: Not on file     Relationship status: Not on file   Other Topics Concern    Not on file   Social History Narrative    Not on file     Family History   Problem Relation Age of Onset    Heart disease Father      Medication List with Changes/Refills   Current Medications    ANASTROZOLE (ARIMIDEX) 1 MG TAB    1 mg once daily .    ASPIRIN (ECOTRIN) 81 MG EC TABLET    Take 1 tablet (81 mg total) by  "mouth once daily.    CHOLECALCIFEROL, VITAMIN D3, (VITAMIN D3) 5,000 UNIT TAB    Take 5,000 Units by mouth once daily.    CYANOCOBALAMIN (VITAMIN B-12) 1000 MCG TABLET    Take 100 mcg by mouth once daily.    EVOLOCUMAB (REPATHA SYRINGE SUBQ)    Inject into the skin.    FLAXSEED OIL 1,000 MG CAP    Take 2,000 mg by mouth once daily.    GLATIRAMER (COPAXONE) 40 MG/ML SYRG INJECTION    Inject 40 mg into the skin every Mon, Wed, Fri.     METOPROLOL SUCCINATE (TOPROL-XL) 50 MG 24 HR TABLET    TAKE 1 TABLET(50 MG) BY MOUTH EVERY MORNING     Review of patient's allergies indicates:   Allergen Reactions    Statins-hmg-coa reductase inhibitors Other (See Comments)     Pt states all "statins make me have heart rhythm changes"    Codeine Nausea And Vomiting    Lipitor [atorvastatin]      Myalgias And Bigeminy    Metformin      May be causing her chronic mild lactic acidosis    Piperacillin     Vancomycin analogues        QUALITY OF LIFE: Unknown    There were no vitals filed for this visit.  There is no height or weight on file to calculate BMI.    PHYSICAL EXAM:   (telemed visit)    ANCILLARY DATA:   12/19 MMG  ACR BI-RADS/FDA CODES:   2-Benign  RECOMMENDATIONS:  1027-Diagnostic Mammogram in 1 year    ASSESSMENT: 75 y.o. female with stage clinical IIA, [id number] converted to IA, [id number](sn)(i-)M0 triple +, g2 IDC L breast s/p TCHP/THP, lumpectomy, adjuvant RT to 5050cGy ending 7/18; completed dual her2 blockade, on arimidex.  PLAN:   Mandy Hidalgo is now greater than 2 years out from adjuvant radiotherapy.  She received systemic therapy, dual HER2 blockade is presently on Arimidex reporting only mild fatigue.  She is otherwise without complaints and has clear mammogram from December of 2019 with follow-up scheduled in December 2020 per Dr. Sultana.  I have asked her to return to clinic in 1 year for continued surveillance.    All questions answered and contact information provided. Patient understands free to call us " anytime with any questions or concerns regarding radiation therapy.    I have personally seen and evaluated this patient. Greater than 50% of this time was spent discussing coordination of care and/or counseling.    Total time spent with patient: 10min    Each patient to whom he or she provides medical services by telemedicine is:  (1) informed of the relationship between the physician and patient and the respective role of any other health care provider with respect to management of the patient; and (2) notified that he or she may decline to receive medical services by telemedicine and may withdraw from such care at any time. Patient verbally consented to receive this service via voice-only telephone call.    This service was not originating from a related E/M service provided within the previous 7 days nor will  to an E/M service or procedure within the next 24 hours or my soonest available appointment.  Prevailing standard of care was able to be met in this audio-only visit.      COVID-19 precautions discussed.    PHYSICIAN: Rui Jones Jr, MD

## 2020-12-21 ENCOUNTER — PATIENT MESSAGE (OUTPATIENT)
Dept: OTHER | Facility: OTHER | Age: 75
End: 2020-12-21

## 2021-01-12 ENCOUNTER — IMMUNIZATION (OUTPATIENT)
Dept: FAMILY MEDICINE | Facility: CLINIC | Age: 76
End: 2021-01-12
Payer: MEDICARE

## 2021-01-12 DIAGNOSIS — Z23 NEED FOR VACCINATION: ICD-10-CM

## 2021-01-12 PROCEDURE — 91300 COVID-19, MRNA, LNP-S, PF, 30 MCG/0.3 ML DOSE VACCINE: CPT | Mod: PBBFAC,PO

## 2021-02-02 ENCOUNTER — IMMUNIZATION (OUTPATIENT)
Dept: FAMILY MEDICINE | Facility: CLINIC | Age: 76
End: 2021-02-02
Payer: MEDICARE

## 2021-02-02 DIAGNOSIS — Z23 NEED FOR VACCINATION: Primary | ICD-10-CM

## 2021-02-02 PROCEDURE — 91300 COVID-19, MRNA, LNP-S, PF, 30 MCG/0.3 ML DOSE VACCINE: CPT | Mod: PBBFAC | Performed by: FAMILY MEDICINE

## 2021-02-02 PROCEDURE — 0002A COVID-19, MRNA, LNP-S, PF, 30 MCG/0.3 ML DOSE VACCINE: CPT | Mod: PBBFAC | Performed by: FAMILY MEDICINE

## 2021-08-17 ENCOUNTER — IMMUNIZATION (OUTPATIENT)
Dept: FAMILY MEDICINE | Facility: CLINIC | Age: 76
End: 2021-08-17
Payer: MEDICARE

## 2021-08-17 DIAGNOSIS — Z23 NEED FOR VACCINATION: Primary | ICD-10-CM

## 2021-08-17 PROCEDURE — 91300 COVID-19, MRNA, LNP-S, PF, 30 MCG/0.3 ML DOSE VACCINE: CPT | Mod: ,,, | Performed by: RADIOLOGY

## 2021-08-17 PROCEDURE — 0003A COVID-19, MRNA, LNP-S, PF, 30 MCG/0.3 ML DOSE VACCINE: ICD-10-PCS | Mod: CV19,,, | Performed by: RADIOLOGY

## 2021-08-17 PROCEDURE — 91300 COVID-19, MRNA, LNP-S, PF, 30 MCG/0.3 ML DOSE VACCINE: ICD-10-PCS | Mod: ,,, | Performed by: RADIOLOGY

## 2021-08-17 PROCEDURE — 0003A COVID-19, MRNA, LNP-S, PF, 30 MCG/0.3 ML DOSE VACCINE: CPT | Mod: CV19,,, | Performed by: RADIOLOGY

## 2021-08-23 ENCOUNTER — CLINICAL SUPPORT (OUTPATIENT)
Dept: RADIATION ONCOLOGY | Facility: CLINIC | Age: 76
End: 2021-08-23
Payer: MEDICARE

## 2021-08-23 DIAGNOSIS — C50.919: Primary | ICD-10-CM

## 2021-08-23 PROCEDURE — 99442 PR PHYSICIAN TELEPHONE EVALUATION 11-20 MIN: CPT | Mod: 95,,, | Performed by: RADIOLOGY

## 2021-08-23 PROCEDURE — 99442 PR PHYSICIAN TELEPHONE EVALUATION 11-20 MIN: ICD-10-PCS | Mod: 95,,, | Performed by: RADIOLOGY

## 2021-12-21 ENCOUNTER — PATIENT MESSAGE (OUTPATIENT)
Dept: NEUROLOGY | Facility: CLINIC | Age: 76
End: 2021-12-21
Payer: MEDICARE

## 2022-02-28 ENCOUNTER — PATIENT MESSAGE (OUTPATIENT)
Dept: PSYCHIATRY | Facility: CLINIC | Age: 77
End: 2022-02-28
Payer: MEDICARE

## 2022-06-24 ENCOUNTER — PATIENT MESSAGE (OUTPATIENT)
Dept: PSYCHIATRY | Facility: CLINIC | Age: 77
End: 2022-06-24
Payer: MEDICARE

## 2022-08-11 PROBLEM — I63.81 ACUTE LACUNAR INFARCTION: Status: RESOLVED | Noted: 2019-11-02 | Resolved: 2022-08-11

## 2022-08-29 ENCOUNTER — OFFICE VISIT (OUTPATIENT)
Dept: RADIATION ONCOLOGY | Facility: CLINIC | Age: 77
End: 2022-08-29
Payer: MEDICARE

## 2022-08-29 VITALS
HEIGHT: 61 IN | TEMPERATURE: 98 F | WEIGHT: 188.38 LBS | HEART RATE: 79 BPM | BODY MASS INDEX: 35.57 KG/M2 | SYSTOLIC BLOOD PRESSURE: 132 MMHG | OXYGEN SATURATION: 89 % | RESPIRATION RATE: 18 BRPM | DIASTOLIC BLOOD PRESSURE: 74 MMHG

## 2022-08-29 DIAGNOSIS — C50.919: Primary | ICD-10-CM

## 2022-08-29 PROCEDURE — 99214 OFFICE O/P EST MOD 30 MIN: CPT | Mod: S$GLB,,, | Performed by: RADIOLOGY

## 2022-08-29 PROCEDURE — 99214 PR OFFICE/OUTPT VISIT, EST, LEVL IV, 30-39 MIN: ICD-10-PCS | Mod: S$GLB,,, | Performed by: RADIOLOGY

## 2022-08-29 NOTE — PROGRESS NOTES
Mandy Hidalgo  [id number]  1945 8/29/2022  No referring provider defined for this encounter.    DIAGNOSIS: clinical IIA, [id number] converted to IA, hdF1bA8(sn)(i-)M0 triple +, g2 IDC L breast     REASON FOR VISIT: Routine scheduled follow-up.    HISTORY OF PRESENT ILLNESS:   Mandy Hidalgo is a 77 y.o. female with screening mammogram detected abnormality of the left breast confirmed by ultrasound to be 1.3 cm in the 2:00 position. MRI of the breast confirmed this to be a 2.1 cm area of enhancement. Biopsy left breast mass returned triple positive, grade 2 invasive ductal carcinoma while biopsy of a left axillary lymph node was ruled reactive. She was placed on neoadjuvant systemic therapy which was poorly tolerated. Originally planned for TCHP converted to THP and stopped before end of program.    *3/18 MRI Breasts  Post neoadjuvant evaluation reveals no residual enhancement and marked decrease in size of the circumscribed lesion with biopsy clip still in place and previously biopsied reactive lymph node decreased in size  *4/18 Dr. Michelle JARAMILLO lumpectomy and SLNBx   - 7 mm invasive ductal carcinoma, grade 2, SBR 6/9, LVSI and PNI negative   - no DCIS  - margin (0.5mm post)   - 0/3 SLNs (i-)    Patient completed adjuvant radiotherapy, 4050 cGy to the left breast, 5050 cGy to lumpectomy cavity in July 2018.    Dr. Sultana: completed 1yr q3wk Herceptin +/- Perjeta based on tolerance    On arimidex.    INTERVAL HISTORY:   Patient denies pain or discomfort within the left breast and reports good range of motion without swelling in the left upper extremity.  She reports good tolerance of Arimidex.  Her only complaint is fatigue which she attributes mostly to MS.    Review of systems otherwise negative unless indicated in HPI/interval history.    Past Medical History:   Diagnosis Date    a Family H/O CAD     Dr. Santos Centeno At [facility] In [place]; (NO ASPIRIN DUE TO H/O ICH AND CHRONIC THROMBOCYTOPENIA); Her  Father    b Hypertension     Dr. Santos Centeno At [facility] In [place]; On Losartan 100 Mg Daily, And Toprol-XL 25 Mg Daily    b White Coat Syndrome     c Hypercholesterolemia     On Repatha; She Does Not Tolerate Statins    c Low HDL Levels     4/5/18 RXd OTC Flaxseed Oil 2K Mg Daily; Lipitor Historically Caused Myalgias And Bigeminy And Neurology Stated No More Statins    c Mild Hypertriglyceridemia     d Prediabetes     8/1/20 RXd Javuvia 50 Mg Daily But She Wanted To Wait On This; Will Start Januvia In The Future If HgA1c Goes > 6.0; 2/7/20 D/Cd Metformin (Lactic Acidosis); On Metformin 500 Mg BID    g Chronic Mild Thrombocytopenia     Am Monitoring    j H/O Laparoscopic Cholecystectomy In 12/2017     Dr. Allen Urrutia In [place]    j Mildly Elevated Lactic Acid Levels     [facility] 1/2/18-1/5/18 Stay Included This Diagnosis; Is This Due To Metformin? 2/7/20 D/Cd Metformin    j Noninfectious Colitis     [facility] 1/2/18-1/5/18 Stay For This; Was Felt Likely Due To CMT And Postcholecystectomy State; Was D/Cd On Alternating PRN Imodium And Lomotil, And Cholestyramine (Questran Light) 4 Gm TID With Meals But This Was Innefective    j Postcholecystectomy Diarrhea     [facility] 1/2/18-1/5/18 Stay For This; Was Felt Likely Due To CMT And Postcholecystectomy State; Was D/Cd On Alternating PRN Imodium And Lomotil, And Cholestyramine (Questran Light) 4 Gm TID With Meals But This Was Innefective    k Stage IIa Left Breast Cancer S/P Lumpectomy In 04/2018 #####    Dr. Luis Sultana At [facility] In [place]; On Arimidex Daily; Underwent Lumpectomy In 04/2018 After 5 CMT TXs Then XRT And (As Of 1/30/19) IV Herceptin And Progetta; This Was Sentinal Node Negative    m H/O Brain AVM Repair Via Left Occipital Craniectomy ###    With H/O Intracranial Hemorrhage; In 08/2014; She Has Right Sided Numbness, And A Mild LUE Tremor, And Diplopia, And Ataxia     m H/O Cerebrovascular Accident In 11/2019 ###    With No Sequelae    m Migraines     m  "Multiple Sclerosis #####    Dr. Anant Sauer; On Copaxone 40 Mg SQ TIW For This    p Diplopia ###    q Vitamin D Deficiency     4/5/18 RXd OTC D3 5K IU Daily    Wellness Visit 8/11/2022      Past Surgical History:   Procedure Laterality Date    BRAIN AVM REPAIR      BREAST LUMPECTOMY Left     CHOLECYSTECTOMY      HYSTERECTOMY       Social History     Socioeconomic History    Marital status:    Tobacco Use    Smoking status: Never    Smokeless tobacco: Never   Substance and Sexual Activity    Alcohol use: Yes     Comment: 1 martini every night    Drug use: No     Family History   Problem Relation Age of Onset    Heart disease Father      Medication List with Changes/Refills   Current Medications    ANASTROZOLE (ARIMIDEX) 1 MG TAB    1 mg once daily .    CHOLECALCIFEROL, VITAMIN D3, 125 MCG (5,000 UNIT) TAB    Take 5,000 Units by mouth once daily.    CHOLESTYRAMINE-ASPARTAME (QUESTRAN LIGHT) 4 GRAM PWPK    Take 1 packet by mouth 2 (two) times daily.     COLESTIPOL (COLESTID) 1 GRAM TAB    Take 1 g by mouth.    CYANOCOBALAMIN (VITAMIN B-12) 1000 MCG TABLET    Take 100 mcg by mouth once daily.    EVOLOCUMAB (REPATHA SYRINGE SUBQ)    Inject into the skin every 14 (fourteen) days.     FLAXSEED OIL 1,000 MG CAP    Take 2,000 mg by mouth once daily.    GLATIRAMER (COPAXONE, GLATOPA) 40 MG/ML INJECTION    Inject 40 mg into the skin every Mon, Wed, Fri.     IRBESARTAN (AVAPRO) 150 MG TABLET    Take 1 tablet by mouth once daily.    KETOCONAZOLE (NIZORAL) 2 % CREAM    APPLY TO GROIN TWICE DAILY UNTIL RASH IS CLEARED    METOPROLOL SUCCINATE (TOPROL-XL) 50 MG 24 HR TABLET    TAKE 1 TABLET(50 MG) BY MOUTH EVERY MORNING    METOPROLOL SUCCINATE (TOPROL-XL) 50 MG 24 HR TABLET    TAKE 1 TABLET(50 MG) BY MOUTH EVERY MORNING     Review of patient's allergies indicates:   Allergen Reactions    Statins-hmg-coa reductase inhibitors Other (See Comments)     Pt states all "statins make me have heart rhythm changes"    Codeine Nausea " "And Vomiting    Lipitor [atorvastatin]      Myalgias And Bigeminy    Metformin      May be causing her chronic mild lactic acidosis    Piperacillin     Vancomycin analogues        QUALITY OF LIFE: 70%- Cares for Self: Unable to Carry on Normal Activity or Active Work    Vitals:    08/29/22 1345   BP: 132/74   Pulse: 79   Resp: 18   Temp: 98.3 °F (36.8 °C)   SpO2: (!) 89%   Weight: 85.5 kg (188 lb 6.4 oz)   Height: 5' 1" (1.549 m)     Body mass index is 35.6 kg/m².    PHYSICAL EXAM:   GENERAL: alert; in no apparent distress.   HEAD: normocephalic, atraumatic.  EYES: pupils are equal, round, reactive to light and accommodation. Sclera anicteric. Conjunctiva not injected.   NOSE/THROAT: no nasal erythema or rhinorrhea. Oropharynx pink, without erythema, ulcerations or thrush.   NECK: no cervical motion rigidity; supple with no masses.  CHEST: Patient is speaking comfortably on room air with normal work of breathing without using accessory muscles of respiration.  CARDIOVASCULAR: regular rate and rhythm  ABDOMEN: soft, nontender, nondistended.   MUSCULOSKELETAL: no tenderness to palpation along the spine or scapulae. Normal range of motion.  NEUROLOGIC: cranial nerves II-XII intact bilaterally. Strength 5/5 in bilateral upper and lower extremities. No sensory deficits appreciated.  LYMPHATIC: no ceft axillary adenopathy appreciated.   EXTREMITIES: no clubbing, cyanosis, edema.  SKIN: no erythema, rashes, ulcerations noted.   BREAST:  No appreciable fibrosis or hyperpigmentation.  No palpable seroma or nodularity.  No evidence of nipple discharge.  No cellulitis, fluctuance, Candida.    ANCILLARY DATA:   1/4/22 MMG  IMPRESSION:   No mammographic evidence of breast malignancy.     Unless otherwise clinically indicated, routine screening mammography with digital breast tomosynthesis is recommended in one year. Patient information has been entered into a reminder system with a target date for the next mammogram for which " the patient will be notified.       ACR BI-RADS/FDA CODES:   2-Benign     RECOMMENDATIONS:  1000 - Screening Mamm in 1 Yr.     ASSESSMENT: Mandy Hidalgo is a female with clinical stage IIA, [id number] converted to IA, [id number](sn)(i-)M0 triple +, g2 IDC L breast.  PLAN:   Mandy Hidalgo presents without evidence of disease.  She is on yearly mammogram schedule with January 2022 mammogram BR-2.  She continues to follow with Ralph Martinez and [facility] with yearly visits.  She reports good tolerance of antiestrogen.  I am very pleased with her progress.    Return to clinic 1 year.    All questions answered and contact information provided. Patient understands free to call us anytime with any questions or concerns regarding radiation therapy.    I have personally seen and evaluated this patient with a moderate to high complexity diagnosis.      Greater than 30 minutes were dedicated to reviewing/interpreting pertinent laboratory/imaging/pathology as well as follow-up with concurrent consultants; reviewing and performing history and physical; counseling patient on continuing oncologic recommendations; documentation in the electronic medical record including ordering of additional tests and/or radiation treatment protocol; and coordination of care with physicians with referrals placed as appropriate.     COVID-19 precautions discussed. Cancer Center policy for COVID-19 testing described.  Patient will be required to wear a mask when in the Cancer Center.    PHYSICIAN: Rui Jones Jr, MD

## 2022-12-01 ENCOUNTER — PATIENT MESSAGE (OUTPATIENT)
Dept: PSYCHIATRY | Facility: CLINIC | Age: 77
End: 2022-12-01
Payer: MEDICARE

## 2023-02-15 PROBLEM — E66.812 CLASS 2 SEVERE OBESITY DUE TO EXCESS CALORIES WITH SERIOUS COMORBIDITY IN ADULT: Status: ACTIVE | Noted: 2023-02-15

## 2023-02-15 PROBLEM — E66.01 CLASS 2 SEVERE OBESITY DUE TO EXCESS CALORIES WITH SERIOUS COMORBIDITY IN ADULT: Status: ACTIVE | Noted: 2023-02-15

## 2023-02-20 ENCOUNTER — PATIENT MESSAGE (OUTPATIENT)
Dept: PSYCHIATRY | Facility: CLINIC | Age: 78
End: 2023-02-20
Payer: MEDICARE

## 2023-07-21 ENCOUNTER — PATIENT MESSAGE (OUTPATIENT)
Dept: PSYCHIATRY | Facility: CLINIC | Age: 78
End: 2023-07-21
Payer: MEDICARE

## 2023-08-28 ENCOUNTER — OFFICE VISIT (OUTPATIENT)
Dept: RADIATION ONCOLOGY | Facility: CLINIC | Age: 78
End: 2023-08-28
Payer: MEDICARE

## 2023-08-28 VITALS
SYSTOLIC BLOOD PRESSURE: 107 MMHG | WEIGHT: 183.19 LBS | TEMPERATURE: 98 F | HEART RATE: 72 BPM | DIASTOLIC BLOOD PRESSURE: 64 MMHG | BODY MASS INDEX: 34.62 KG/M2 | RESPIRATION RATE: 16 BRPM | OXYGEN SATURATION: 97 %

## 2023-08-28 DIAGNOSIS — C50.919: Primary | ICD-10-CM

## 2023-08-28 PROCEDURE — 99214 OFFICE O/P EST MOD 30 MIN: CPT | Mod: S$GLB,,, | Performed by: RADIOLOGY

## 2023-08-28 PROCEDURE — 99214 PR OFFICE/OUTPT VISIT, EST, LEVL IV, 30-39 MIN: ICD-10-PCS | Mod: S$GLB,,, | Performed by: RADIOLOGY

## 2023-08-28 RX ORDER — CHOLECALCIFEROL (VITAMIN D3) 125 MCG
CAPSULE ORAL
COMMUNITY

## 2023-08-28 RX ORDER — ASPIRIN 81 MG/1
81 TABLET ORAL
COMMUNITY

## 2023-08-28 NOTE — PROGRESS NOTES
Mandy Hidalgo  [id number]  1945 8/28/2023  No referring provider defined for this encounter.    DIAGNOSIS: clinical IIA, [id number] converted to IA, [id number](sn)(i-)M0 triple +, g2 IDC L breast     REASON FOR VISIT: Routine scheduled follow-up.    HISTORY OF PRESENT ILLNESS:   Mandy Hidalgo is a 78 y.o. female with screening mammogram detected abnormality of the left breast confirmed by ultrasound to be 1.3 cm in the 2:00 position. MRI of the breast confirmed this to be a 2.1 cm area of enhancement. Biopsy left breast mass returned triple positive, grade 2 invasive ductal carcinoma while biopsy of a left axillary lymph node was ruled reactive. She was placed on neoadjuvant systemic therapy which was poorly tolerated. Originally planned for TCHP converted to THP and stopped before end of program.    *3/18 MRI Breasts  Post neoadjuvant evaluation reveals no residual enhancement and marked decrease in size of the circumscribed lesion with biopsy clip still in place and previously biopsied reactive lymph node decreased in size  *4/18 Dr. Michelle JARAMILLO lumpectomy and SLNBx   - 7 mm invasive ductal carcinoma, grade 2, SBR 6/9, LVSI and PNI negative   - no DCIS  - margin (0.5mm post)   - 0/3 SLNs (i-)    Patient completed adjuvant radiotherapy, 4050 cGy to the left breast, 5050 cGy to lumpectomy cavity in July 2018.    Dr. Sultana: completed 1yr q3wk Herceptin +/- Perjeta based on tolerance    On arimidex.    INTERVAL HISTORY:   Patient denies pain or discomfort within the left breast and reports good range of motion without swelling in the left upper extremity.    Her only complaint is fatigue which she attributes mostly to MS. Plan to d/c AI end of the month.    Review of systems otherwise negative unless indicated in HPI/interval history.    Past Medical History:   Diagnosis Date    a Family H/O Early CAD     Dr. Santos Centeno At [facility] In [place]; (NO ASPIRIN DUE TO H/O ICH AND CHRONIC THROMBOCYTOPENIA); Her  Father    b Hypertension     Dr. Santos Centeno At [facility] In [place]; On Losartan 100 Mg Daily, And Toprol-XL 25 Mg Daily    b White Coat Syndrome     c Hypercholesterolemia     On Repatha; She Does Not Tolerate Statins    c Low HDL Levels     4/5/18 RXd OTC Flaxseed Oil 2K Mg Daily; Lipitor Historically Caused Myalgias And Bigeminy And Neurology Stated No More Statins    c Mild Hypertriglyceridemia     d Prediabetes     8/1/20 RXd Javuvia 50 Mg Daily But She Wanted To Wait On This; Will Start Januvia In The Future If HgA1c Goes > 6.0; 2/7/20 D/Cd Metformin (Lactic Acidosis); On Metformin 500 Mg BID    g Chronic Mild Thrombocytopenia     Am Monitoring    j H/O Laparoscopic Cholecystectomy In 12/2017     Dr. Aleln Urrutia In [place]    j Mildly Elevated Lactic Acid Levels     [facility] 1/2/18-1/5/18 Stay Included This Diagnosis; Is This Due To Metformin? 2/7/20 D/Cd Metformin    j Noninfectious Colitis     [facility] 1/2/18-1/5/18 Stay For This; Was Felt Likely Due To CMT And Postcholecystectomy State; Was D/Cd On Alternating PRN Imodium And Lomotil, And Cholestyramine (Questran Light) 4 Gm TID With Meals But This Was Innefective    j Postcholecystectomy Diarrhea     [facility] 1/2/18-1/5/18 Stay For This; Was Felt Likely Due To CMT And Postcholecystectomy State; Was D/Cd On Alternating PRN Imodium And Lomotil, And Cholestyramine (Questran Light) 4 Gm TID With Meals But This Was Innefective    k Stage IIa Left Breast Cancer S/P Lumpectomy In 04/2018 #####    Dr. Luis Sultana At [facility] In [place]; On Arimidex Daily; Underwent Lumpectomy In 04/2018 After 5 CMT TXs Then XRT And (As Of 1/30/19) IV Herceptin And Progetta; This Was Sentinal Node Negative    m H/O Brain AVM Repair Via Left Occipital Craniectomy ###    With H/O Intracranial Hemorrhage; In 08/2014; She Has Right Sided Numbness, And A Mild LUE Tremor, And Diplopia, And Ataxia     m H/O Cerebrovascular Accident In 11/2019 ###    With No Sequelae    m Migraines     m  Multiple Sclerosis #####    Dr. Anant Sauer; On Copaxone 40 Mg SQ TIW For This    p Diplopia ###    q Vitamin D Deficiency     4/5/18 RXd OTC D3 5K IU Daily    Wellness Visit 8/11/2022      Past Surgical History:   Procedure Laterality Date    BRAIN AVM REPAIR      BREAST LUMPECTOMY Left     CHOLECYSTECTOMY      HYSTERECTOMY       Social History     Socioeconomic History    Marital status:    Tobacco Use    Smoking status: Never    Smokeless tobacco: Never   Substance and Sexual Activity    Alcohol use: Yes     Comment: 1 martini every night    Drug use: No     Family History   Problem Relation Age of Onset    Heart disease Father      Medication List with Changes/Refills   Current Medications    ANASTROZOLE (ARIMIDEX) 1 MG TAB    1 mg once daily .    ASPIRIN (ECOTRIN) 81 MG EC TABLET    Take 81 mg by mouth 3 (three) times a week.    AZELAIC ACID (FINACEA) 15 % GEL    Apply to face twice a day    BIOTIN 10,000 MCG TBDL    Take by mouth.    CHOLECALCIFEROL, VITAMIN D3, 125 MCG (5,000 UNIT) TAB    Take 5,000 Units by mouth once daily.    CHOLESTYRAMINE-ASPARTAME (QUESTRAN LIGHT) 4 GRAM PWPK    Take 1 packet by mouth 2 (two) times daily.     COLESTIPOL (COLESTID) 1 GRAM TAB    Take 1 tablet (1 g total) by mouth once daily.    CYANOCOBALAMIN (VITAMIN B-12) 1000 MCG TABLET    Take 100 mcg by mouth once daily.    EVOLOCUMAB (REPATHA SYRINGE SUBQ)    Inject into the skin every 14 (fourteen) days.     GLATIRAMER (COPAXONE, GLATOPA) 40 MG/ML INJECTION    Inject 40 mg into the skin every Mon, Wed, Fri.     IRBESARTAN (AVAPRO) 150 MG TABLET    Take 1 tablet by mouth once daily.    KETOCONAZOLE (NIZORAL) 2 % CREAM    APPLY TO GROIN TWICE DAILY UNTIL RASH IS CLEARED    METOPROLOL SUCCINATE (TOPROL-XL) 50 MG 24 HR TABLET    TAKE 1 TABLET(50 MG) BY MOUTH EVERY MORNING    METOPROLOL SUCCINATE (TOPROL-XL) 50 MG 24 HR TABLET    TAKE 1 TABLET(50 MG) BY MOUTH EVERY MORNING     Review of patient's allergies indicates:  "  Allergen Reactions    Statins-hmg-coa reductase inhibitors Other (See Comments)     Pt states all "statins make me have heart rhythm changes"    Codeine Nausea And Vomiting    Lipitor [atorvastatin]      Myalgias And Bigeminy    Metformin      May be causing her chronic mild lactic acidosis    Piperacillin     Vancomycin analogues        QUALITY OF LIFE: 70%- Cares for Self: Unable to Carry on Normal Activity or Active Work    Vitals:    08/28/23 1345   BP: 107/64   Pulse: 72     There is no height or weight on file to calculate BMI.    PHYSICAL EXAM:   GENERAL: alert; in no apparent distress.   HEAD: normocephalic, atraumatic.  EYES: pupils are equal, round, reactive to light and accommodation. Sclera anicteric. Conjunctiva not injected.   NOSE/THROAT: no nasal erythema or rhinorrhea. Oropharynx pink, without erythema, ulcerations or thrush.   NECK: no cervical motion rigidity; supple with no masses.  CHEST: Patient is speaking comfortably on room air with normal work of breathing without using accessory muscles of respiration.  CARDIOVASCULAR: regular rate and rhythm  ABDOMEN: soft, nontender, nondistended.   MUSCULOSKELETAL: no tenderness to palpation along the spine or scapulae. Normal range of motion.  NEUROLOGIC: cranial nerves II-XII intact bilaterally. Strength 5/5 in bilateral upper and lower extremities. No sensory deficits appreciated.  LYMPHATIC: no ceft axillary adenopathy appreciated.   EXTREMITIES: no clubbing, cyanosis, edema.  SKIN: no erythema, rashes, ulcerations noted.   BREAST:  No appreciable fibrosis or hyperpigmentation.  No palpable seroma or nodularity.  No evidence of nipple discharge.  No cellulitis, fluctuance, or Candida.    ANCILLARY DATA:   5/10/23 R breast US  BI-RADS Category:     2-Benign    1/6/23 MMG/US  There are left breast postsurgical/postradiation changes. There is a right upper outer quadrant intramammary lymph node at posterior depth which appears slightly more prominent " when compared with prior exams.  There are no suspicious masses, suspicious calcifications, architectural distortion, or secondary signs of malignancy the left either breast.    ASSESSMENT: Mandy Hidalgo is a female with clinical stage IIA, [id number] converted to IA, jjC3gH0(sn)(i-)M0 triple +, g2 IDC L breast.  PLAN:     - RT very well tolerated.  - DANIEL by СЕРГЕЙ DENTON, 1/23 MMG.  - Studies: yearly MMG  - Follow up with Michelle Silvestre  - Now 5 years out from adjuvant radiotherapy without signs or symptoms of recurrence she will be discharged from routine Radiation Oncology follow-up and can follow-up here as needed.    All questions answered and contact information provided. Patient understands free to call us anytime with any questions or concerns regarding radiation therapy.    I have personally seen and evaluated this patient with a moderate to high complexity diagnosis.      Greater than 30 minutes were dedicated to reviewing/interpreting pertinent laboratory/imaging/pathology as well as follow-up with concurrent consultants; reviewing and performing history and physical; counseling patient on continuing oncologic recommendations; documentation in the electronic medical record including ordering of additional tests and/or radiation treatment protocol; and coordination of care with physicians with referrals placed as appropriate.      PHYSICIAN: Rui Jones Jr, MD

## 2024-01-22 ENCOUNTER — PATIENT MESSAGE (OUTPATIENT)
Dept: PSYCHIATRY | Facility: CLINIC | Age: 79
End: 2024-01-22
Payer: MEDICARE

## 2024-05-02 ENCOUNTER — PATIENT MESSAGE (OUTPATIENT)
Dept: PSYCHIATRY | Facility: CLINIC | Age: 79
End: 2024-05-02
Payer: MEDICARE

## 2024-05-20 ENCOUNTER — PATIENT MESSAGE (OUTPATIENT)
Dept: PSYCHIATRY | Facility: CLINIC | Age: 79
End: 2024-05-20
Payer: MEDICARE

## 2024-07-25 ENCOUNTER — PATIENT MESSAGE (OUTPATIENT)
Dept: PSYCHIATRY | Facility: CLINIC | Age: 79
End: 2024-07-25
Payer: MEDICARE

## 2024-08-05 ENCOUNTER — PATIENT MESSAGE (OUTPATIENT)
Dept: PSYCHIATRY | Facility: CLINIC | Age: 79
End: 2024-08-05
Payer: MEDICARE

## 2024-12-16 ENCOUNTER — PATIENT MESSAGE (OUTPATIENT)
Dept: PSYCHIATRY | Facility: CLINIC | Age: 79
End: 2024-12-16
Payer: MEDICARE